# Patient Record
Sex: FEMALE | Race: WHITE
[De-identification: names, ages, dates, MRNs, and addresses within clinical notes are randomized per-mention and may not be internally consistent; named-entity substitution may affect disease eponyms.]

---

## 2017-11-10 ENCOUNTER — HOSPITAL ENCOUNTER (OUTPATIENT)
Dept: HOSPITAL 35 - RAD | Age: 72
End: 2017-11-10
Attending: FAMILY MEDICINE
Payer: COMMERCIAL

## 2017-11-10 DIAGNOSIS — Z12.31: Primary | ICD-10-CM

## 2019-06-01 ENCOUNTER — HOSPITAL ENCOUNTER (EMERGENCY)
Dept: HOSPITAL 35 - ER | Age: 74
Discharge: HOME | End: 2019-06-01
Payer: COMMERCIAL

## 2019-06-01 VITALS — BODY MASS INDEX: 16.99 KG/M2 | HEIGHT: 61 IN | WEIGHT: 89.99 LBS

## 2019-06-01 VITALS — DIASTOLIC BLOOD PRESSURE: 92 MMHG | SYSTOLIC BLOOD PRESSURE: 149 MMHG

## 2019-06-01 DIAGNOSIS — K58.9: ICD-10-CM

## 2019-06-01 DIAGNOSIS — Z88.5: ICD-10-CM

## 2019-06-01 DIAGNOSIS — K21.9: ICD-10-CM

## 2019-06-01 DIAGNOSIS — Z98.890: ICD-10-CM

## 2019-06-01 DIAGNOSIS — G44.209: Primary | ICD-10-CM

## 2019-06-01 DIAGNOSIS — M81.0: ICD-10-CM

## 2019-06-01 DIAGNOSIS — Z88.0: ICD-10-CM

## 2019-06-01 DIAGNOSIS — Z88.8: ICD-10-CM

## 2019-06-01 DIAGNOSIS — Z88.6: ICD-10-CM

## 2019-06-01 DIAGNOSIS — M62.830: ICD-10-CM

## 2019-06-01 DIAGNOSIS — F41.0: ICD-10-CM

## 2019-06-01 DIAGNOSIS — Z91.040: ICD-10-CM

## 2019-06-05 ENCOUNTER — HOSPITAL ENCOUNTER (OUTPATIENT)
Dept: HOSPITAL 35 - PAIN | Age: 74
End: 2019-06-05
Attending: ANESTHESIOLOGY
Payer: COMMERCIAL

## 2019-06-05 VITALS — BODY MASS INDEX: 16.8 KG/M2 | WEIGHT: 89 LBS | HEIGHT: 60.98 IN

## 2019-06-05 VITALS — DIASTOLIC BLOOD PRESSURE: 82 MMHG | SYSTOLIC BLOOD PRESSURE: 124 MMHG

## 2019-06-05 DIAGNOSIS — M48.02: Primary | ICD-10-CM

## 2019-06-05 DIAGNOSIS — M12.88: ICD-10-CM

## 2019-06-05 NOTE — NUR
Pain Clinic Assessment:
 
1. History of Osteoarthritis:
Left Upper Extremity
Right Upper Extremity
   History of Rheumatoid Arthritis:
Not Applicable
 
2. Height: 5 ft. 1 in. 154.9 cm.
   Weight: 89.0 lb.  oz. 40.370 kg.
   Patient's BMI: 16.8
 
3. Vital Signs:
   BP: 124/82 Pulse: 82 Resp: 14
   Temp:  02 Sat: 99 ECG Mon:
 
4. Pain Intensity: 4
 
5. Fall Risk:
   Dizziness: Y  Needs help standing or walking: N
   Fallen in the last 3 months: N
   Fall risk comments:
 
 
6. Patient on Blood Thinner: None
 
7. History of Hypertension: Y
 
8. Opioid Therapy greater than 6 weeks: N
   Opiate Contract Signed:
 
9. Risk Assessment Tool Provided:
 
10. Functional Assessment Tool: 52/70
 
11. Recreational Drug Use: Never Drug Type:
    Tobacco Use: Former Smoker Tobacco Type: Cigarettes
       Amount or Packs/day: 1/2 How Many Years: 50
    Alcohol Use: No  Frequency:  Quant:

## 2019-06-18 ENCOUNTER — HOSPITAL ENCOUNTER (OUTPATIENT)
Dept: HOSPITAL 35 - PAIN | Age: 74
End: 2019-06-18
Attending: ANESTHESIOLOGY
Payer: COMMERCIAL

## 2019-06-18 VITALS — HEIGHT: 60.98 IN | WEIGHT: 91.6 LBS | BODY MASS INDEX: 17.29 KG/M2

## 2019-06-18 VITALS — DIASTOLIC BLOOD PRESSURE: 80 MMHG | SYSTOLIC BLOOD PRESSURE: 114 MMHG

## 2019-06-18 DIAGNOSIS — G89.29: ICD-10-CM

## 2019-06-18 DIAGNOSIS — M48.02: Primary | ICD-10-CM

## 2019-06-18 NOTE — NUR
Pain Clinic Assessment:
 
1. History of Osteoarthritis:
Left Upper Extremity
Right Upper Extremity
   History of Rheumatoid Arthritis:
Not Applicable
 
2. Height: 5 ft. 1 in. 154.9 cm.
   Weight: 91.6 lb.  oz. 41.549 kg.
   Patient's BMI: 17.3
 
3. Vital Signs:
   BP: 114/80 Pulse: 77 Resp: 14
   Temp:  02 Sat: 98 ECG Mon:
 
4. Pain Intensity: 4
 
5. Fall Risk:
   Dizziness: N  Needs help standing or walking: N
   Fallen in the last 3 months: N
   Fall risk comments:
 
 
6. Patient on Blood Thinner: None
 
7. History of Hypertension: Y
 
8. Opioid Therapy greater than 6 weeks: N
   Opiate Contract Signed:
 
9. Risk Assessment Tool Provided:
 
10. Functional Assessment Tool: 52/70
 
11. Recreational Drug Use: Never Drug Type:
    Tobacco Use: Former Smoker Tobacco Type:
       Amount or Packs/day:  How Many Years:
    Alcohol Use: No  Frequency:  Quant:

## 2019-10-02 ENCOUNTER — HOSPITAL ENCOUNTER (OUTPATIENT)
Dept: HOSPITAL 35 - PAIN | Age: 74
End: 2019-10-02
Attending: CLINICAL NURSE SPECIALIST
Payer: COMMERCIAL

## 2019-10-02 VITALS — SYSTOLIC BLOOD PRESSURE: 112 MMHG | DIASTOLIC BLOOD PRESSURE: 79 MMHG

## 2019-10-02 VITALS — BODY MASS INDEX: 17.08 KG/M2 | WEIGHT: 94 LBS | HEIGHT: 62.01 IN

## 2019-10-02 DIAGNOSIS — M48.02: ICD-10-CM

## 2019-10-02 DIAGNOSIS — G89.4: ICD-10-CM

## 2019-10-02 DIAGNOSIS — M12.88: ICD-10-CM

## 2019-10-02 DIAGNOSIS — M54.12: Primary | ICD-10-CM

## 2019-10-02 DIAGNOSIS — Z88.8: ICD-10-CM

## 2019-10-02 NOTE — NUR
Pain Clinic Assessment:
 
1. History of Osteoarthritis:
Left Upper Extremity
Right Upper Extremity
   History of Rheumatoid Arthritis:
Not Applicable
 
2. Height: 5 ft. 2 in. 157.5 cm.
   Weight: 94.0 lb.  oz. 42.638 kg.
   Patient's BMI: 17.2
 
3. Vital Signs:
   BP: 112/79 Pulse: 81 Resp: 16
   Temp:  02 Sat: 95 ECG Mon:
 
4. Pain Intensity: 7
 
5. Fall Risk:
   Dizziness: N  Needs help standing or walking: N
   Fallen in the last 3 months: Y
   Fall risk comments:
 
 
6. Patient on Blood Thinner: None
 
7. History of Hypertension: Y
 
8. Opioid Therapy greater than 6 weeks: N
   Opiate Contract Signed:
 
9. Risk Assessment Tool Provided: LOW-0
 
10. Functional Assessment Tool: 52/70
 
11. Recreational Drug Use: Never Drug Type:
    Tobacco Use: Former Smoker Tobacco Type:
       Amount or Packs/day:  How Many Years:
    Alcohol Use: No  Frequency:  Quant:

## 2019-10-03 NOTE — HPC
[facility]
[address]
[place]   [address]                     PAIN MANAGEMENT CONSULTATION  
_______________________________________________________________________________
 
Name:       JOCELYNRACHEL JOY            Room #:                     REG [facility]..#:      [id number]                       Account #:      [id number]  
Admission:  10/02/19    Attend Phys:    Caprice Montesinos     
Discharge:              Date of Birth:  [birth year]  
                                                          Report #: [id number]
                                                                    [id number]   
_______________________________________________________________________________
THIS REPORT FOR:   //name//                          
 
CC: Caprice Montesinos
    FAM physician/PCP
    FAM unknown
 
DATE OF SERVICE:  10/02/2019
 
 
CHIEF COMPLAINT:  Cervical radiculopathy and right upper extremity pain and
paresthesias.
 
HISTORY OF PRESENT ILLNESS:  This is a 74-year-old female who returns to the
pain clinic today for refill of her medications that she uses to help treat her
ongoing head and cervical pain.  It does radiate into her right arm greater than
her left and her right shoulder.  It is a constant, aching, cramping pain that
is worse at the end of the day after she has had prolonged work and working on
her computer as well as raising her arms.  She feels that tizanidine is very
beneficial in controlling this as well as moist heat and rest.  She would like
refills of this tizanidine today.
 
ALLERGIES:  ANTIHISTAMINES, PENICILLIN, ASPIRIN and LATEX.
 
CURRENT LIST OF MEDICATIONS:  Tizanidine 2 mg b.i.d. p.r.n., ibuprofen p.r.n.,
Anoro Ellipta, tramadol 50 mg p.r.n., Cozaar 25 mg daily, flaxseed oil,
buspirone 15 mg b.i.d., amlodipine 2.5 mg at bedtime, probiotic, calcium,
multivitamin, Protonix 40 mg daily and alprazolam 0.25 t.i.d. p.r.n.
 
PQRS:
1.  She has osteoarthritis in her upper extremities, but denies any rheumatoid
arthritis.
2.  Height 5 feet 2 inches, weight is 94, and BMI is 17.
3.  Vital signs 112/79, pulse is 81, respirations 16, oxygen sat is 95.
4.  Pain score 7/10.
5.  Denies dizziness, does not need help walking or standing, has fallen in the
last 3 months.
6.  The patient is not on any blood thinners, but does take medicine for
hypertension.
7.  The patient does not take opioids.
8.  Risk assessment is low.  Functional assessment is 52/70.
9.  Recreational drug use, she denies she is a former smoker and does not drink
alcohol.  We did not check the prescription monitoring system.  The patient is
not taking narcotics on a regular basis.
 
PHYSICAL EXAMINATION:
GENERAL:  This is an anorexic anxious 74-year-old female who appears her stated
 
 
 
[place] [address]                     PAIN MANAGEMENT CONSULTATION  
_______________________________________________________________________________
 
Name:       RACHEL BANKS            Room #:                     REG [facility] 
SHANNON#:      [id number]                       Account #:      [id number]  
Admission:  10/02/19    Attend Phys:    Caprice Montesinos     
Discharge:              Date of Birth:  [birth year]  
                                                          Report #: [id number]
                                                                    [id number]   
_______________________________________________________________________________
age, placing her current pain score at 5/10 today.
HEENT:  Normocephalic, atraumatic.  Pupils equal, round and reactive to light. 
Extraocular muscles are intact.
EXTREMITIES:  No clubbing, no cyanosis, no edema.
MUSCULOSKELETAL:  Intact to light touch from C5-T1 dermatomes.  She has cervical
provocation testing, is met with increase in pain and mild crepitus Upper
extremity strength is symmetrical, deconditioned bilaterally, right weaker than
the left.
 
ASSESSMENT:
1.  Cervicalgia.
2.  Cervical facet arthropathy throughout.
3.  Neural foraminal stenosis of the cervical spine.
4.  Cervical radiculopathy.
5.  Chronic intractable pain with muscle spasms.
 
PLAN:
1.  We discussed treatment options with the patient today.  The patient is doing
physical therapy several times a week and continues her stretching exercise and
traction techniques at work and at home.  She finds this very beneficial in
helping reduce some of the spasms as well as taking 1 tizanidine daily,
occasionally needing to.  She finds that after work is when her pain has
increased and this medication is very beneficial.  She is requesting refills of
this medication today.
2.  The patient will continue then on tizanidine for muscle spasms.  Scripts
given today for 2 mg b.i.d., #60 with 5 additional refills.  I did explain to
the patient that she could get this medication from her nurse practitioner in
the future when these refills have been used or she may return to the Pain
Clinic if she has any increased pain.  Otherwise, I am sure Maria Ines Mccain, nurse
practitioner would gladly refill this for her.  The patient verbalizes
understanding.
3.  The patient is seen in collaboration today with Dr. Sidney Xiong.
 
 
 
 
 
 
 
 
 
 
 
 
  <ELECTRONICALLY SIGNED>
   By: Caprice Montesinos             
  10/03/19     0828
D: 10/02/19 0827                           _____________________________________
T: 10/03/19 0106                           Caprice Montesinos               /nt

## 2020-08-12 ENCOUNTER — HOSPITAL ENCOUNTER (OUTPATIENT)
Dept: HOSPITAL 35 - BC | Age: 75
End: 2020-08-12
Payer: COMMERCIAL

## 2020-08-12 DIAGNOSIS — Z12.31: Primary | ICD-10-CM

## 2020-09-10 ENCOUNTER — HOSPITAL ENCOUNTER (OUTPATIENT)
Dept: HOSPITAL 35 - RAD | Age: 75
End: 2020-09-10
Attending: INTERNAL MEDICINE
Payer: COMMERCIAL

## 2020-09-10 DIAGNOSIS — J43.9: Primary | ICD-10-CM

## 2020-09-15 ENCOUNTER — HOSPITAL ENCOUNTER (OUTPATIENT)
Dept: HOSPITAL 35 - CV | Age: 75
End: 2020-09-15
Attending: INTERNAL MEDICINE
Payer: COMMERCIAL

## 2020-09-15 DIAGNOSIS — I08.8: Primary | ICD-10-CM

## 2020-09-15 DIAGNOSIS — R06.02: ICD-10-CM

## 2020-09-15 DIAGNOSIS — R06.00: ICD-10-CM

## 2020-09-15 DIAGNOSIS — J43.9: ICD-10-CM

## 2020-09-15 DIAGNOSIS — R91.1: ICD-10-CM

## 2020-09-15 NOTE — 2DMMODE
[facility]
[address]
[place]   [address]                   2 D/M-MODE ECHOCARDIOGRAM     
_______________________________________________________________________________
 
Name:       RACHEL BANKSY            Room #:                     REG [facility]#:      [id number]                       Account #:      [id number]  
Admission:  09/15/20    Attend Phys:    Mart Pineda MD  
Discharge:              Date of Birth:  [birth year]  
                                                          Report #: [id number]
                                                                    [id number]
_______________________________________________________________________________
THIS REPORT FOR:  
 
cc:  FAM - Family physician unknown
     FAM - Family physician unknown
     Jj Torre MD                                        
                                                                       ~
 
--------------- APPROVED REPORT --------------
 
 
Study performed:  09/15/2020 08:32:01
 
EXAM: Comprehensive 2D, Doppler, and color-flow 
Echocardiogram 
Patient Location: Out-Patient   
      Status:  routine
 
      BSA:         1.36
HR: 84 bpm BP:          134/86 mmHg 
Rhythm: NSR     
 
Other Information 
Study Quality: Good/Lung Interference
 
Indications
Dyspnea 
Hx; COPD, HTN, Mild CAD.
 
2D Dimensions
RVDd:  24.64 mm  
IVSd:  7.95 (7-11mm) LVOT Diam:  18.64 (18-24mm) 
LVDd:  35.60 mm  
PWd:  7.77 (7-11mm) Ascending Ao:  34.59 (22-36mm)
LVDs:  19.31 (25-40mm) 
Aortic Root:  36.38 mm 
 
Volumes
Left Atrial Volume (Systole) 
Single Plane 4CH:  24.84 mL 
 
Aortic Valve
AoV Peak Serge.:  1.15 m/s 
AO Peak Gr.:  5.33 mmHg  LVOT Max P.75 mmHg
    LVOT Max V:  0.83 m/s
DAI Vmax: 1.96 cm2  
 
 
 
[facility]
[address]
[place]  [address]
Phone:  [phone]                    2 D/M-MODE ECHOCARDIOGRAM     
_______________________________________________________________________________
 
Name:            RACHEL BANKS RAVINDER            Room #:                    REG [facility]#:           [id number]          Account #:     [id number]  
Admission:       09/15/20         Attend Phys:   Mart Pineda, 
Discharge:                  Date of Birth: [birth year]  
                         Report #:      [id number]
        [id number]
_______________________________________________________________________________
Mitral Valve
    E/A Ratio:  0.8
    MV Decel. Time:  261.18 ms
MV E Max Serge.:  0.36 m/s 
MV A Serge.:  0.47 m/s  
MV PHT:  75.74 ms  
IVRT:  78.43 ms   
 
Pulmonary Valve
PV Peak Serge.:  0.84 m/s PV Peak Gr.:  2.85 mmHg
 
Pulmonary Vein
P Vein S:    0.46 m/s 
P Vein D:   0.43 m/s 
P Vein S/D Ratio:  1.07 
 
Tricuspid Valve
TR Peak Serge.:  2.71 m/s  RAP Estimate:  5.00 mmHg
TR Peak Gr.:  29.32 mmHg 
    PA Pressure:  34.00 mmHg
 
Left Ventricle
The left ventricle is normal size. There is normal LV segmental wall 
motion. There is normal left ventricular wall thickness. The left 
ventricular systolic function is normal. LVEF is 60-65%. Mild 
diastolic dysfunction is present (impaired relaxation 
pattern).
 
Right Ventricle
The right ventricle is normal size. The right ventricular systolic 
function is normal.
 
Atria
The left atrium size is normal. The right atrium size is 
normal.
 
Aortic Valve
The aortic valve is normal in structure. Aortic valve leaflets are 
mildly thickened. Trace aortic regurgitation. There is no aortic 
valvular stenosis.
 
Mitral Valve
The mitral valve is normal in structure. Trace to mild mitral 
regurgitation. No evidence of mitral valve stenosis.
 
Tricuspid Valve
 
 
[facility]
[address]
[place]  [address]
Phone:  [phone]                    2 D/M-MODE ECHOCARDIOGRAM     
_______________________________________________________________________________
 
Name:            RACHEL BANKS            Room #:                    REG [facility]#:           [id number]          Account #:     [id number]  
Admission:       09/15/20         Attend Phys:   Mart Pineda, 
Discharge:                  Date of Birth: [birth year]  
                         Report #:      [id number]
        [id number]
_______________________________________________________________________________
The tricuspid valve is normal in structure. Trace tricuspid 
regurgitation. Estimated PAP 35 mmHg.
 
Pulmonic Valve
The pulmonary valve is normal in structure. Mild pulmonic 
regurgitation.
 
Great Vessels
Aortic root is mildly dilated. The ascending aorta is normal in size. 
IVC is normal in size and collapses >50% with 
inspiration.
 
Pericardium
There is no pericardial effusion.
 
<Conclusion>
LVEF is 60-65%.
The aortic valve is normal in structure. 
Aortic valve leaflets are mildly thickened.
Trace aortic regurgitation.
The mitral valve is normal in structure.
Trace to mild mitral regurgitation.
The tricuspid valve is normal in structure.
Trace tricuspid regurgitation. Estimated PAP 35 mmHg.
The pulmonary valve is normal in structure.
Mild pulmonic regurgitation.
Aortic root is mildly dilated.
There is no pericardial effusion.
 
 
 
 
 
 
 
 
 
 
 
 
 
 
 
 
  <ELECTRONICALLY SIGNED>
   By: Jj Torre MD    
  09/15/20     0944
D: 09/15/20 0944                           _____________________________________
T: 09/15/20 0944                           Jj Torre MD      /INF

## 2020-11-13 ENCOUNTER — HOSPITAL ENCOUNTER (OUTPATIENT)
Dept: HOSPITAL 35 - SJCVC | Age: 75
End: 2020-11-13
Attending: INTERNAL MEDICINE
Payer: COMMERCIAL

## 2020-11-13 DIAGNOSIS — R06.00: Primary | ICD-10-CM

## 2020-11-13 DIAGNOSIS — M35.00: ICD-10-CM

## 2020-11-13 DIAGNOSIS — I77.6: ICD-10-CM

## 2020-11-13 DIAGNOSIS — J44.9: ICD-10-CM

## 2020-11-13 DIAGNOSIS — I10: ICD-10-CM

## 2020-11-13 DIAGNOSIS — R07.89: ICD-10-CM

## 2020-11-13 DIAGNOSIS — Z87.891: ICD-10-CM

## 2020-11-13 DIAGNOSIS — Z79.899: ICD-10-CM

## 2020-11-13 DIAGNOSIS — I73.00: ICD-10-CM

## 2020-12-18 ENCOUNTER — HOSPITAL ENCOUNTER (OUTPATIENT)
Dept: HOSPITAL 35 - SJCVCIMAG | Age: 75
End: 2020-12-18
Attending: INTERNAL MEDICINE
Payer: COMMERCIAL

## 2020-12-18 DIAGNOSIS — I10: ICD-10-CM

## 2020-12-18 DIAGNOSIS — E78.5: ICD-10-CM

## 2020-12-18 DIAGNOSIS — J44.1: ICD-10-CM

## 2020-12-18 DIAGNOSIS — Z87.891: ICD-10-CM

## 2020-12-18 DIAGNOSIS — R06.00: Primary | ICD-10-CM

## 2021-08-05 ENCOUNTER — HOSPITAL ENCOUNTER (OUTPATIENT)
Dept: HOSPITAL 35 - BC | Age: 76
End: 2021-08-05
Payer: COMMERCIAL

## 2021-08-05 DIAGNOSIS — Z12.31: Primary | ICD-10-CM

## 2021-10-07 ENCOUNTER — HOSPITAL ENCOUNTER (OUTPATIENT)
Dept: HOSPITAL 35 - SJCVC | Age: 76
End: 2021-10-07
Attending: INTERNAL MEDICINE
Payer: COMMERCIAL

## 2021-10-07 DIAGNOSIS — I10: Primary | ICD-10-CM

## 2021-10-07 DIAGNOSIS — R06.00: ICD-10-CM

## 2021-10-07 DIAGNOSIS — Z79.899: ICD-10-CM

## 2021-10-07 DIAGNOSIS — J43.9: ICD-10-CM

## 2021-10-07 DIAGNOSIS — Z87.891: ICD-10-CM

## 2021-10-07 DIAGNOSIS — I73.00: ICD-10-CM

## 2021-10-07 DIAGNOSIS — Z88.8: ICD-10-CM

## 2021-10-07 DIAGNOSIS — M35.00: ICD-10-CM

## 2021-10-07 DIAGNOSIS — Z88.2: ICD-10-CM
